# Patient Record
Sex: FEMALE | Race: WHITE
[De-identification: names, ages, dates, MRNs, and addresses within clinical notes are randomized per-mention and may not be internally consistent; named-entity substitution may affect disease eponyms.]

---

## 2018-04-12 ENCOUNTER — HOSPITAL ENCOUNTER (EMERGENCY)
Dept: HOSPITAL 74 - JER | Age: 33
LOS: 1 days | Discharge: HOME | End: 2018-04-13
Payer: COMMERCIAL

## 2018-04-12 VITALS — HEART RATE: 67 BPM

## 2018-04-12 VITALS — BODY MASS INDEX: 28.9 KG/M2

## 2018-04-12 DIAGNOSIS — I10: ICD-10-CM

## 2018-04-12 DIAGNOSIS — R31.29: Primary | ICD-10-CM

## 2018-04-12 LAB
ALBUMIN SERPL-MCNC: 3.6 G/DL (ref 3.4–5)
ALP SERPL-CCNC: 64 U/L (ref 45–117)
ALT SERPL-CCNC: 19 U/L (ref 12–78)
ANION GAP SERPL CALC-SCNC: 7 MMOL/L (ref 8–16)
APPEARANCE UR: CLEAR
APTT BLD: 27.9 SECONDS (ref 26.9–34.4)
AST SERPL-CCNC: 15 U/L (ref 15–37)
BASOPHILS # BLD: 0.7 % (ref 0–2)
BILIRUB SERPL-MCNC: 0.3 MG/DL (ref 0.2–1)
BILIRUB UR STRIP.AUTO-MCNC: NEGATIVE MG/DL
BUN SERPL-MCNC: 25 MG/DL (ref 7–18)
CALCIUM SERPL-MCNC: 8.5 MG/DL (ref 8.5–10.1)
CHLORIDE SERPL-SCNC: 108 MMOL/L (ref 98–107)
CO2 SERPL-SCNC: 27 MMOL/L (ref 21–32)
COLOR UR: (no result)
CREAT SERPL-MCNC: 0.9 MG/DL (ref 0.55–1.02)
DEPRECATED RDW RBC AUTO: 14.2 % (ref 11.6–15.6)
EOSINOPHIL # BLD: 4.8 % (ref 0–4.5)
EPITH CASTS URNS QL MICRO: (no result) /HPF
GLUCOSE SERPL-MCNC: 89 MG/DL (ref 74–106)
HCG UR QL: NEGATIVE
HCT VFR BLD CALC: 39 % (ref 32.4–45.2)
HGB BLD-MCNC: 13.3 GM/DL (ref 10.7–15.3)
INR BLD: 0.88 (ref 0.82–1.09)
KETONES UR QL STRIP: NEGATIVE
LEUKOCYTE ESTERASE UR QL STRIP.AUTO: NEGATIVE
LYMPHOCYTES # BLD: 40 % (ref 8–40)
MCH RBC QN AUTO: 28.9 PG (ref 25.7–33.7)
MCHC RBC AUTO-ENTMCNC: 34 G/DL (ref 32–36)
MCV RBC: 85 FL (ref 80–96)
MONOCYTES # BLD AUTO: 7.8 % (ref 3.8–10.2)
NEUTROPHILS # BLD: 46.7 % (ref 42.8–82.8)
NITRITE UR QL STRIP: NEGATIVE
PH UR: 6 [PH] (ref 5–8)
PLATELET # BLD AUTO: 249 K/MM3 (ref 134–434)
PMV BLD: 8.7 FL (ref 7.5–11.1)
POTASSIUM SERPLBLD-SCNC: 4.2 MMOL/L (ref 3.5–5.1)
PROT SERPL-MCNC: 7.8 G/DL (ref 6.4–8.2)
PROT UR QL STRIP: NEGATIVE
PROT UR QL STRIP: NEGATIVE
PT PNL PPP: 10 SEC (ref 9.98–11.88)
RBC # BLD AUTO: 4.59 M/MM3 (ref 3.6–5.2)
RBC # UR STRIP: (no result) /UL
SODIUM SERPL-SCNC: 142 MMOL/L (ref 136–145)
SP GR UR: 1.02 (ref 1–1.03)
UROBILINOGEN UR STRIP-MCNC: NEGATIVE MG/DL (ref 0.2–1)
WBC # BLD AUTO: 8 K/MM3 (ref 4–10)

## 2018-04-12 PROCEDURE — 3E033NZ INTRODUCTION OF ANALGESICS, HYPNOTICS, SEDATIVES INTO PERIPHERAL VEIN, PERCUTANEOUS APPROACH: ICD-10-PCS

## 2018-04-12 NOTE — PDOC
History of Present Illness





- General


Chief Complaint: Pain


Stated Complaint: PCP SENT/ABDOMINAL PAIN


Time Seen by Provider: 04/12/18 19:46





- History of Present Illness


Initial Comments: 





04/12/18 20:06


The patient is a 33 year old female with a history of HTN who presents for 

evaluation of abdominal pain.  The patient reports onset of RUQ abdominal pain 

1 day ago worse with some foods with some associated radiation to the right 

shoulder.  She states that she has never had similar symptoms in the past and 

was evaluated at [facility] urgent care and sent to the ED for further evaluation.

  The patient otherwise denies fevers, chills, SOB, chest pain, nausea, vomiting

, or changes with urination or bowel movements.  She also denies any vaginal 

bleeding, or vaginal discharge.








Past History





- Past Medical History


Allergies/Adverse Reactions: 


 Allergies











Allergy/AdvReac Type Severity Reaction Status Date / Time


 


Penicillins Allergy Mild  Verified 04/12/18 19:49











Home Medications: 


Ambulatory Orders





NK [No Known Home Medication]  04/12/18 











- Suicide/Smoking/Psychosocial Hx


Smoking History: Never smoked


Hx Alcohol Use: Yes (social)





**Review of Systems





- Review of Systems


Comments:: 





04/12/18 20:09


Constitutional: No fevers, chills, fatigue, malaise


HEENT: No Rhinorrhea, nasal congestion, visual changes


Cardiovascular: No chest pain, syncope, palpitations, lightheadedness


Respiratory: No Cough, SOB, Hemoptysis,


Gastrointestinal: Abdominal pain.  No Nausea, Vomiting, Constipation, Diarrhea, 

Melena


Genitourinary: No Dysuria, Frequency, Urgency, Hesitancy, Hematuria, Flank pain


Musculoskeletal: No Myalgia, arthralgia


Skin: No rashes, itching, bruising, pallor


Neurologic: No Headache, Dizziness, Numbness, Weakness, or Tingling


Psychiatric: No Hallucinations. No SI or HI








*Physical Exam





- Vital Signs


 Last Vital Signs











Temp Pulse Resp BP Pulse Ox


 


 98.2 F   67   20   179/85   99 


 


 04/12/18 19:47  04/12/18 19:47  04/12/18 19:47  04/12/18 19:47  04/12/18 19:47














- Physical Exam


Comments: 





04/12/18 20:09


General Appearance: Nourished. No Apparent Distress


HEENT: EOMI, AISLINN. No Pharyngeal Erythema, Tonsillar Exudate, Tonsillar Erythema


Neck: No Cervical Lymphadenopathy


Respiratory/Chest: Lungs Clear, Normal Breath Sounds. No Crackles, Rales, 

Rhonchi, Wheezing


Cardiovascular: Regular Rhythm, Regular Rate. No Murmur, Gallops, Rubs


Gastrointestinal/Abdominal: Normal Bowel Sounds, Soft. Mild tenderness to deep 

palpation in the RUQ.  No Guarding, Rebound,


Musculoskeletal: No CVA Tenderness


Extremity: Normal Capillary Refill


Integumentary: Normal Color, Dry, Warm


Neurologic: Fully Oriented, Alert, Normal Mood/Affect, Normal Response, 





ED Treatment Course





- LABORATORY


CBC & Chemistry Diagram: 


 04/12/18 20:34





 04/12/18 20:34





Medical Decision Making





- Medical Decision Making





04/12/18 20:10


The patient is a 33 year old female with a history of HTN who presents for 

evaluation of abdominal pain.  Differential includes but is not limited to: 

Cholecystitis, Pancreatitis, Gastritis, UTI, infectious, metabolic derangement.

  Given the patient's physical exam and history, we will obtain a cbc, cmp, 

lipase, ua, urine preg and gallbladder US to evaluate further for possible 

etiologies.  We will continue to monitor and reassess in the meantime.





04/12/18 23:51


CBC, cmp, lipase, ua, urne preg are unremarkable.  US demonstrates right sided 

hydronephrosis without evidence of gallstones as read by our radiologist.  

Given the patient symptoms, it is possible her symptoms are due to a kidney 

stone.  We will obtain a spiral renal CT to evaluate for kidney stones.  We 

will continue to monitor and reassess.





*DC/Admit/Observation/Transfer


Diagnosis at time of Disposition: 


 RUQ pain








- Referrals


Referrals: 


Mini Hollingsworth [Primary Care Provider] - 





- Patient Instructions





- Post Discharge Activity

## 2018-04-12 NOTE — PDOC
Attending Attestation





- HPI


HPI: 





04/12/18 20:57


The patient is a 33 year old female with past medical history of HTN presents 

to the ED complaining of right upper quadrant pain which radiated to her right 

shoulder  since last night. The patient reports earlier today she visited [facility] urgent care and was recommended to follow up with the ED because they 

suspect the problem is emerging from the gallbladder. The patient reports she 

had spicy food last week and was trying to burp when the pain manifested and 

remained on her epigastric region.The patient reports the worse gets worse 

after eating without any relieving factors. The patient reports the pain stayed 

constant until last night when she was woken up from her sleep due to the pain. 

The patient reports symptoms of nausea but denies any vomiting. 


The patient denies chest pain, shortness of breath, headache and dizziness.


Denies fever, chills, diarrhea and constipation, vaginal discharge or bleeding 


Denies dysuria, frequency, urgency and hematuria.


Allergies: Penicillins 


Past surgical history: None reported 


Social history: None reported 








- Physicial Exam


PE: 





04/12/18 20:57


GENERAL: Awake, alert, and fully oriented, in no acute distress


HEAD: No signs of trauma


EYES: PERRLA, EOMI, sclera anicteric, conjunctiva clear


ENT: Auricles normal inspection, hearing grossly normal, nares patent, 

oropharynx clear without exudates. Moist mucosa


NECK: Normal ROM, supple, no lymphadenopathy, JVD, or masses


LUNGS: Breath sounds equal, clear to auscultation bilaterally.  No wheezes, and 

no crackles


HEART: Regular rate and rhythm, normal S1 and S2, no murmurs, rubs or gallops


ABDOMEN: (+) Right upper quadrant tenderness.  Soft, normoactive bowel sounds.  

No guarding, no rebound.  No masses


EXTREMITIES: Normal range of motion, no edema.  No clubbing or cyanosis. No 

cords, erythema, or tenderness


NEUROLOGICAL: Cranial nerves II through XII grossly intact.  Normal speech, 

normal gait


SKIN: Warm, Dry, normal turgor, no rashes or lesions noted.








- Medical Decision Making








04/12/18 20:58





Documentation prepared by Liza Witt, acting as medical scribe for Leighann Smith DO.





<Liza Witt - Last Filed: 04/12/18 20:57>





- Resident


Resident Name: Jose Angel Sky





- ED Attending Attestation


I have performed the following: I have examined & evaluated the patient, The 

case was reviewed & discussed with the resident, I agree w/resident's findings 

& plan, Exceptions are as noted





- Medical Decision Making





04/12/18 20:04








I, Dr. Leighann Smith, DO, attest that this document has been prepared under 

my direction and personally reviewed by me in its entirety.   I further attest, 

that it accurately reflects all work, treatment, procedures and medical decision

-making performed by me.  


04/12/18 20:37


a/p: 34yo female with intermittent RUQ pain


-assoc with eating


-concern for biliary disease vs PUD vs gastritis


-will obtain labs, RUQ u/s


-discussed plan with the patient who agrees with the plan


-elevated bp on exam - noncomplaint with bystolic and hctz, states she hasn't 

taken them in over a month, however, no cp/sob or symptoms concerning for end 

organ damage from elevated bp


04/12/18 23:50


blood in urine - not currently menstruating


RUQ u/s shows moderate hydro - concern for kidney stone


will obtain noncon ct abd


04/13/18 00:04


pt states she underwent an ultrasound a few years ago - report shows b/l mild 

hydro


states hx of microscopic hematuria


pt updated on need for ct noncon abd/pelvis


agrees with the plan


denies pain or nausea


04/13/18 00:53


ct abd/pelvis does not show acute uropathy or hydro. no bladder calculi


discussed the ct findings with the patient


discussed restarting her bp meds


discussed follow up with urology for further eval of microscopic hematuria 


discussed follow up with GI if abd pain continues


labs reviewed with the patient. The patient is stable for d/c to home. No pain 

while in the ED








<Leighann Smith - Last Filed: 04/13/18 00:57>





**Discharge Disposition





- Discharge Dispostion


Admit: No





<Leighann Smith - Last Filed: 04/13/18 00:57>





- Diagnosis


 RUQ pain, Asymptomatic microscopic hematuria, Hypertension








- Discharge Dispostion


Disposition: HOME


Condition at time of disposition: Stable





- Referrals


Referrals: 


Mini Hollingsworth [Primary Care Provider] - 


Neil Granger MD [Staff Physician] - 


Raymon Rico MD [Staff Physician] - 





- Patient Instructions


Printed Discharge Instructions:  DI for Hematuria, High Blood Pressure, DI for 

Abdominal Pain-Adult


Additional Instructions: 


Please make an appointment to see the urologist and your PMD. If the abdominal 

pain continues please also follow up with the gastroenterologist. Please return 

to the ED with any further concerns or complaints. Please restart taking your 

blood pressure medications. 





- Post Discharge Activity


Work/School Note:  Back to Work

## 2018-04-12 NOTE — PDOC
Rapid Medical Evaluation


Time Seen by Provider: 04/12/18 19:46


Medical Evaluation: 





04/12/18 19:46


I have performed a brief in-person evaluation of this patient. 


The patient presents with a chief complaint of: sent by city MD, possible sridevi


Pertinent physical exam findings: tenderness to RUQ


I have ordered the following: labs, urine


The patient will proceed to the ED for further evaluation.





**Discharge Disposition





- Diagnosis


 RUQ pain








- Referrals





- Patient Instructions





- Post Discharge Activity

## 2018-04-13 VITALS — SYSTOLIC BLOOD PRESSURE: 176 MMHG | TEMPERATURE: 98 F | DIASTOLIC BLOOD PRESSURE: 85 MMHG
